# Patient Record
Sex: FEMALE | Race: AMERICAN INDIAN OR ALASKA NATIVE
[De-identification: names, ages, dates, MRNs, and addresses within clinical notes are randomized per-mention and may not be internally consistent; named-entity substitution may affect disease eponyms.]

---

## 2017-10-13 ENCOUNTER — HOSPITAL ENCOUNTER (OUTPATIENT)
Dept: HOSPITAL 5 - SPVWC | Age: 62
Discharge: HOME | End: 2017-10-13
Attending: FAMILY MEDICINE
Payer: COMMERCIAL

## 2017-10-13 DIAGNOSIS — Z12.31: Primary | ICD-10-CM

## 2017-10-13 PROCEDURE — 77067 SCR MAMMO BI INCL CAD: CPT

## 2017-10-13 PROCEDURE — G0202 SCR MAMMO BI INCL CAD: HCPCS

## 2017-10-13 NOTE — MAMMOGRAPHY REPORT
BILATERAL DIGITAL SCREENING MAMMOGRAM with CAD: 10/13/17 09:07:00



CLINICAL: Routine screening.



COMPARISON:None available.



FINDINGS: The breasts are heterogeneously dense, which may obscure 

small masses. No mass, architectural distortion or suspicious 

calcifications.



IMPRESSION: No mammographic evidence of malignancy.



BI-RADS CATEGORY: 1 - - Negative



RECOMMENDATION: Routine mammographic screening in one year.





COMMENT:

Patient follow-up letters are generated by our [employer] application.

## 2018-10-19 ENCOUNTER — HOSPITAL ENCOUNTER (OUTPATIENT)
Dept: HOSPITAL 5 - SPVWC | Age: 63
Discharge: HOME | End: 2018-10-19
Attending: FAMILY MEDICINE
Payer: COMMERCIAL

## 2018-10-19 DIAGNOSIS — Z12.31: Primary | ICD-10-CM

## 2018-10-19 PROCEDURE — 77067 SCR MAMMO BI INCL CAD: CPT

## 2018-10-19 NOTE — MAMMOGRAPHY REPORT
BILATERAL DIGITAL SCREENING MAMMOGRAM with CAD: 10/19/18 10:08:00



CLINICAL: Routine screening.



COMPARISON:10/13/17



FINDINGS: The breasts are heterogeneously dense, which may obscure 

small masses. No mass, architectural distortion or suspicious 

calcifications.



IMPRESSION: No mammographic evidence of malignancy.



BI-RADS CATEGORY: 1 - - Negative



RECOMMENDATION: Routine mammographic screening in one year.





COMMENT:

Patient follow-up letters are generated by our [employer] application.

## 2019-11-22 ENCOUNTER — HOSPITAL ENCOUNTER (OUTPATIENT)
Dept: HOSPITAL 5 - SPVWC | Age: 64
Discharge: HOME | End: 2019-11-22
Attending: FAMILY MEDICINE
Payer: COMMERCIAL

## 2019-11-22 DIAGNOSIS — Z78.0: ICD-10-CM

## 2019-11-22 DIAGNOSIS — Z12.31: Primary | ICD-10-CM

## 2019-11-22 PROCEDURE — 77080 DXA BONE DENSITY AXIAL: CPT

## 2019-11-22 PROCEDURE — 77067 SCR MAMMO BI INCL CAD: CPT

## 2019-11-25 NOTE — MAMMOGRAPHY REPORT
DIGITAL SCREENING MAMMOGRAM WITH CAD, 11/22/2019



INDICATION: Routine screening mammography. 



TECHNIQUE:  Digital bilateral  2D mammography was obtained in the craniocaudal and mediolateral obliq
ue projections. This examination was interpreted with the benefit of Computer-Aided Detection analysi
s.



COMPARISON: 10/19/2018



FINDINGS: 



Breast Density: The breasts are heterogeneously dense, which may obscure small masses.



There is no evidence of dominant mass, suspicious calcifications or architectural distortion in eithe
r breast.



IMPRESSION: No mammographic evidence of malignancy.





Follow up recommendation: Routine yearly



BI-RADS Category 1:  Negative.



A "normal" or negative report should not discourage follow up or biopsy of a clinically significant f
inding.



A written summary of these findings will be mailed to the patient. The patient will be entered into a
 mammography reporting system which will generate a reminder letter for the patient's next appointmen
t at the appropriate interval.



The American College [employer] Radiology recommends yearly mammograms starting at age 40 and continuing as l
shiela as a woman is in good health.  Breast MRI is recommended for women with an approximate 20-25% or 
greater lifetime risk of breast cancer, including women with a strong family history of breast or ova
catherine cancer or who have been treated for Hodgkin's disease.



Signer Name: Cody Carrillo MD 

Signed: 11/25/2019 12:09 PM

 Workstation Name: [id number]

## 2019-11-25 NOTE — MAMMOGRAPHY REPORT
BONE DEXA



CLINICAL: Postmenopausal



TECHNIQUE: 3 site bone DEXA performed on an Hologic scanner.



FINDINGS:



The average BMD of the lumbar spine L1-L4 is 1.172g/cm squared with a T score of +0.2 and a Z score o
f +1.9. 



The average total BMD of the left hip is 0.920 g/cm squared with a T score of -0.7and a Z score of +0
.1. 



The left femoral neck BMD is 0.920 g/cm squared with a T score of -1.3 and a Z score of -0.2.





IMPRESSION:

1. WHO classification: Normal with average fracture risk based on spine and total left hip measuremen
ts.

2. WHO classification osteopenia with increased fracture risk based on left femoral neck measurements
.



RECOMMENDATION: Clinical correlation and routine screening.



Definitions:

BMD equal bone mineral density

T score = BMD related to peak bone mass of young adult (Alexa expressed an standard deviation)

Z score = age-matched BMD expressed in SD

World health organization (WHO) diagnostic criteria

Normal T score greater than equal to 1 standard deviation

Osteopenia T score between -1 and -2.4 standard deviation

Osteoporosis T score -2.5 standard deviation or below.



Note: BMD is not the only risk factor for fracture; also consider factors such as the patient's age, 
risk of falling, previous osteoporotic fracture, family history of osteoporotic fractures, current sm
oker and low body weight.



Z scores are not calculated if greater than 80 years of age.



Signer Name: Cody Carrillo MD 

Signed: 11/25/2019 12:04 PM

 Workstation Name: [id number]

## 2020-12-04 ENCOUNTER — HOSPITAL ENCOUNTER (OUTPATIENT)
Dept: HOSPITAL 5 - SPVWC | Age: 65
Discharge: HOME | End: 2020-12-04
Attending: FAMILY MEDICINE
Payer: COMMERCIAL

## 2020-12-04 DIAGNOSIS — Z12.31: Primary | ICD-10-CM

## 2020-12-04 PROCEDURE — 77067 SCR MAMMO BI INCL CAD: CPT

## 2020-12-04 NOTE — MAMMOGRAPHY REPORT
DIGITAL SCREENING MAMMOGRAM WITH CAD, 12/4/2020



INDICATION: Routine screening mammography. 



TECHNIQUE:  Digital bilateral  2D mammography was obtained in the craniocaudal and mediolateral obliq
ue projections. This examination was interpreted with the benefit of Computer-Aided Detection analysi
s.



COMPARISON: 11/22/2019. 10/19/2018.



FINDINGS: 



Breast Density: The breasts are heterogeneously dense, which may obscure small masses.



There is no evidence of dominant mass, suspicious calcifications or architectural distortion in eithe
r breast.



IMPRESSION:



Follow up recommendation: Routine yearly



BI-RADS Category 1:  Negative.



A "normal" or negative report should not discourage follow up or biopsy of a clinically significant f
inding.



A written summary of these findings will be mailed to the patient. The patient will be entered into a
 mammography reporting system which will generate a reminder letter for the patient's next appointmen
t at the appropriate interval.



The American College of Radiology recommends yearly mammograms starting at age 40 and continuing as l
sheila as a woman is in good health.  Breast MRI is recommended for women with an approximate 20-25% or 
greater lifetime risk of breast cancer, including women with a strong family history of breast or ova
catherine cancer or who have been treated for Hodgkin's disease.



Signer Name: Po Wilkins MD 

Signed: 12/4/2020 10:43 AM

Workstation Name: [email or web address]

## 2021-12-27 ENCOUNTER — HOSPITAL ENCOUNTER (OUTPATIENT)
Dept: HOSPITAL 5 - SPVWC | Age: 66
Discharge: HOME | End: 2021-12-27
Attending: FAMILY MEDICINE
Payer: COMMERCIAL

## 2021-12-27 DIAGNOSIS — Z78.0: ICD-10-CM

## 2021-12-27 DIAGNOSIS — Z12.31: Primary | ICD-10-CM

## 2021-12-27 DIAGNOSIS — M85.88: ICD-10-CM

## 2021-12-27 PROCEDURE — 77080 DXA BONE DENSITY AXIAL: CPT

## 2021-12-27 PROCEDURE — 77067 SCR MAMMO BI INCL CAD: CPT

## 2021-12-27 NOTE — MAMMOGRAPHY REPORT
DEXA BONE DENSITY SCAN



INDICATION / CLINICAL INFORMATION: ASYMPTOMATIC AGE RELATED POSTMENOPAUSAL STATE.

66 years Female



COMPARISON: 11/22/19



LUMBAR SPINE, L1-L4:

- Bone mineral density (BMD) = 1.170 g/cm2.

- T-score = 0.2 

- Change (%) since most recent prior (if available):  -0.2%



LEFT HIP, NECK :

- Bone mineral density (BMD) = 0.716 g/cm2.

- T-score = -1.7 

- Change (%) since most recent prior (if available):  -5.9%







IMPRESSION:

1. WHO Classification: Osteopenia. Fracture Risk: Increased.





Note: 10-Year Fracture Risk (FRAX) not reported. This DEXA unit lacks FRAX functionality.







=================================================================

BMD Reporting Guidelines (ISCD, 2015)

=================================================================

BMD Reporting in Postmenopausal Women and in Men Age 50 and Older

- T-scores are preferred.

- The WHO densitometric classification is applicable.



BMD Reporting in Females Prior to Menopause and in Males Younger Than Age 50

- Z-scores, not T-scores, are preferred. This is particularly important in children.

- A Z-score of -2.0 or lower is defined as below the expected range for age, and a Z-score above -2.0
 is within the expected range for age.

- Osteoporosis cannot be diagnosed in men under age 50 on the basis of BMD alone.

- The WHO diagnostic criteria may be applied to women in the menopausal transition.





http://www.iscd.org/official-positions/4251-wdhf-hiiydigd-positions-adult/





Signer Name: PAT Zurita MD 

Signed: 12/27/2021 10:39 AM

Workstation Name: [email or web address]

## 2021-12-27 NOTE — MAMMOGRAPHY REPORT
DIGITAL SCREENING MAMMOGRAM WITH CAD, 12/27/2021



CLINICAL INFORMATION / INDICATION: Routine screening mammography.



TECHNIQUE:  Digital bilateral 2D mammography was obtained in the craniocaudal and mediolateral obliqu
e projections. This examination was interpreted with the benefit of Computer-Aided Detection analysis
.



COMPARISON: 12/4/2020, 11/22/2019, 10/19/2018



FINDINGS: 



Breast Density: The breasts are heterogeneously dense, which may obscure small masses.



No dominant mass, suspicious calcifications, or architectural distortion in either breast. 





 

IMPRESSION: No mammographic evidence of malignancy.



Follow up recommendation: Routine yearly



BI-RADS Category 1:  NEGATIVE





-------------------------------------------------------------------------------------------

A "normal" or negative report should not discourage follow up or biopsy of a clinically significant f
inding.



A written summary of these findings will be mailed to the patient. The patient will be entered into a
 mammography reporting system which will generate a reminder letter for the patient's next appointmen
t at the appropriate interval.



The American College of [employer] recommends yearly mammograms starting at age 40 and continuing as l
sheila as a woman is in good health.  Breast MRI is recommended for women with an approximate 20-25% or 
greater lifetime risk of breast cancer, including women with a strong family history of breast or ova
catherine cancer or who have been treated for Hodgkin's disease.



Signer Name: Misyt Robertson MD 

Signed: 12/27/2021 10:48 AM

Workstation Name: [id number]